# Patient Record
Sex: FEMALE | Race: WHITE | Employment: UNEMPLOYED | URBAN - METROPOLITAN AREA
[De-identification: names, ages, dates, MRNs, and addresses within clinical notes are randomized per-mention and may not be internally consistent; named-entity substitution may affect disease eponyms.]

---

## 2022-03-18 LAB
C. TRACHOMATIS, EXTERNAL RESULT: NEGATIVE
N. GONORRHOEAE, EXTERNAL RESULT: NEGATIVE

## 2022-06-16 LAB
ABO, EXTERNAL RESULT: NORMAL
HEP B, EXTERNAL RESULT: NEGATIVE
HEPATITIS C ANTIBODY, EXTERNAL RESULT: NEGATIVE
HIV, EXTERNAL RESULT: NEGATIVE
RH FACTOR, EXTERNAL RESULT: POSITIVE
RPR, EXTERNAL RESULT: NEGATIVE
RUBELLA TITER, EXTERNAL RESULT: NORMAL

## 2022-09-01 NOTE — PROGRESS NOTES
Labs unable to abstract: 8/3/22 glucola (122), hgb (10.5), hct(31.4) plts(223). 6/24/22 Ab Screen (negative), hgb (12.2), hct (35.4), plts(223).

## 2022-09-06 ENCOUNTER — NURSE ONLY (OUTPATIENT)
Dept: OBGYN CLINIC | Age: 32
End: 2022-09-06

## 2022-09-06 ENCOUNTER — ROUTINE PRENATAL (OUTPATIENT)
Dept: OBGYN CLINIC | Age: 32
End: 2022-09-06

## 2022-09-06 VITALS
HEIGHT: 66 IN | DIASTOLIC BLOOD PRESSURE: 76 MMHG | WEIGHT: 176 LBS | BODY MASS INDEX: 28.28 KG/M2 | SYSTOLIC BLOOD PRESSURE: 118 MMHG

## 2022-09-06 VITALS — HEIGHT: 66 IN | BODY MASS INDEX: 28.28 KG/M2 | WEIGHT: 176 LBS

## 2022-09-06 DIAGNOSIS — O09.899 RUBELLA NON-IMMUNE STATUS, ANTEPARTUM: ICD-10-CM

## 2022-09-06 DIAGNOSIS — Z3A.35 35 WEEKS GESTATION OF PREGNANCY: Primary | ICD-10-CM

## 2022-09-06 DIAGNOSIS — Z3A.35 35 WEEKS GESTATION OF PREGNANCY: ICD-10-CM

## 2022-09-06 DIAGNOSIS — Z34.90 ENCOUNTER FOR SUPERVISION OF LOW-RISK PREGNANCY, ANTEPARTUM: ICD-10-CM

## 2022-09-06 DIAGNOSIS — Z34.90 NORMAL REPEAT PREGNANCY, ANTEPARTUM: Primary | ICD-10-CM

## 2022-09-06 DIAGNOSIS — O44.40 LOW-LYING PLACENTA: ICD-10-CM

## 2022-09-06 DIAGNOSIS — O99.019 ANEMIA AFFECTING PREGNANCY, ANTEPARTUM: ICD-10-CM

## 2022-09-06 DIAGNOSIS — Z28.39 RUBELLA NON-IMMUNE STATUS, ANTEPARTUM: ICD-10-CM

## 2022-09-06 DIAGNOSIS — Z28.21 COVID-19 VACCINATION DECLINED: ICD-10-CM

## 2022-09-06 PROCEDURE — 99902 PR PRENATAL VISIT: CPT | Performed by: OBSTETRICS & GYNECOLOGY

## 2022-09-06 NOTE — PROGRESS NOTES
LATE OB TRANSER from [facility] 58 35w3d by d=8wk US   Denies LOF, VB, Ctxs. Good FM.       GBS at NV    *All records reviewed in depth by me today    +hx COVID 19 --DECLINED vaccination    Rubella NI -- MMR pp     Last Pap 18-- wnl (pt requests Pap postpartum)   STD cxs neg (3/22)    Glucola wnl at 122      OB hx  G1  2014 at [address] (7#6)-- Eula Page"   G2 MAB w/ D&C   G3  17 at 41wks (8#4.6)--\"Sexton\"  G4  5/15/19 at 39wks(7#9.1) --\"Paul\"  G5  2020 at 39wks (7#14.6)-- \"Alla Colon\"  G6 current         Anemia:    Hgb 10.5 (8/3/22)   Reports taking iron   Repeat NV ___          Prior Low lying placenta:  Posterior; now RESOLVED on last US in [place] on 22:  GP 50% (3#4), AC 25%

## 2022-09-06 NOTE — PROGRESS NOTES
NOB consult with pt. Pt transferred from [place], [place]. Labs today: none. Offered pt the option of CF, SMA, and Fragile X carrier testing. Pt declines testing. Offered pt the option of genetic screening (1st screen vs Tetra vs NIPT). Pt declines . Instructed pt on exercise/nutrition in pregnancy. Reviewed [facility] preg book. Advised pt on using SFE for hospital needs and S[facility] L&D for pregnancy related emergencies. Pt states understanding. NOB forms signed and scanned. Medical Hx: none    Surgical Hx: D&C 2016    Last Pap: 2018- pt request pap to be done postpartum    Pt OB c/o: yeast infection- using Monistat     COVID vaccine declined. Reviewed need for Tdap. Records reviewed and abstracted into chart.

## 2022-09-14 ENCOUNTER — ROUTINE PRENATAL (OUTPATIENT)
Dept: OBGYN CLINIC | Age: 32
End: 2022-09-14

## 2022-09-14 VITALS — BODY MASS INDEX: 28.73 KG/M2 | SYSTOLIC BLOOD PRESSURE: 121 MMHG | DIASTOLIC BLOOD PRESSURE: 76 MMHG | WEIGHT: 178 LBS

## 2022-09-14 DIAGNOSIS — O99.013 ANEMIA AFFECTING PREGNANCY IN THIRD TRIMESTER: ICD-10-CM

## 2022-09-14 DIAGNOSIS — O44.40 LOW-LYING PLACENTA: ICD-10-CM

## 2022-09-14 DIAGNOSIS — Z36.85 SCREENING, ANTENATAL, FOR STREPTOCOCCUS B: ICD-10-CM

## 2022-09-14 DIAGNOSIS — Z34.90 ENCOUNTER FOR SUPERVISION OF LOW-RISK PREGNANCY, ANTEPARTUM: Primary | ICD-10-CM

## 2022-09-14 PROCEDURE — 99902 PR PRENATAL VISIT: CPT | Performed by: OBSTETRICS & GYNECOLOGY

## 2022-09-14 NOTE — PROGRESS NOTES
LYSSA [id number] 36w4d  Denies LOF, VB, Ctxs. Good FM.       LATE OB TRANSER from [place], [place] at 35wks      GBS today  SVE: pt deferred    ceph by cristel     Pt desires elective IOL at 39wks for childcare purposes -- will plan to schedule at [facility] 19 vaccination       Last Pap 18-- wnl (pt requests Pap postpartum)   STD cxs neg (3/22)        OB hx  G1  2014 at [address] [facility] (7#6)-- Edwena Reaper"   G2 MAB w/ D&C   G3  17 at 41wks (8#4.6)--\"Sexton\"  G4  5/15/19 at 39wks(7#9.1) --\"Paul\"  G5  2020 at 39wks (7#14.6)-- \"Alla Colon\"  G6 current         Anemia:    Hgb 10.5 (8/3/22)   Reports taking iron   Repeat [place] (unable today due to 4 kids in office)          Prior Low lying placenta:  Posterior; now RESOLVED on last US in [place] on 22:  GP 50% (3#4), AC 25%

## 2022-09-18 LAB
BACTERIA SPEC CULT: NORMAL
SERVICE CMNT-IMP: NORMAL

## 2022-09-21 ENCOUNTER — ROUTINE PRENATAL (OUTPATIENT)
Dept: OBGYN CLINIC | Age: 32
End: 2022-09-21

## 2022-09-21 VITALS
HEIGHT: 66 IN | WEIGHT: 183 LBS | DIASTOLIC BLOOD PRESSURE: 60 MMHG | BODY MASS INDEX: 29.41 KG/M2 | SYSTOLIC BLOOD PRESSURE: 116 MMHG

## 2022-09-21 DIAGNOSIS — O44.40 LOW-LYING PLACENTA: ICD-10-CM

## 2022-09-21 DIAGNOSIS — Z34.90 ENCOUNTER FOR SUPERVISION OF LOW-RISK PREGNANCY, ANTEPARTUM: Primary | ICD-10-CM

## 2022-09-21 DIAGNOSIS — O99.013 ANEMIA AFFECTING PREGNANCY IN THIRD TRIMESTER: ICD-10-CM

## 2022-09-21 DIAGNOSIS — Z3A.37 37 WEEKS GESTATION OF PREGNANCY: ICD-10-CM

## 2022-09-21 LAB
ERYTHROCYTE [DISTWIDTH] IN BLOOD BY AUTOMATED COUNT: 13.2 % (ref 11.9–14.6)
HCT VFR BLD AUTO: 34.4 % (ref 35.8–46.3)
HGB BLD-MCNC: 10.9 G/DL (ref 11.7–15.4)
MCH RBC QN AUTO: 28.9 PG (ref 26.1–32.9)
MCHC RBC AUTO-ENTMCNC: 31.7 G/DL (ref 31.4–35)
MCV RBC AUTO: 91.2 FL (ref 79.6–97.8)
NRBC # BLD: 0 K/UL (ref 0–0.2)
PLATELET # BLD AUTO: 255 K/UL (ref 150–450)
PMV BLD AUTO: 10.4 FL (ref 9.4–12.3)
RBC # BLD AUTO: 3.77 M/UL (ref 4.05–5.2)
WBC # BLD AUTO: 7.8 K/UL (ref 4.3–11.1)

## 2022-09-21 PROCEDURE — 99902 PR PRENATAL VISIT: CPT | Performed by: OBSTETRICS & GYNECOLOGY

## 2022-09-21 NOTE — PROGRESS NOTES
Mercedes Hagen \"Sherly\"  presents for NADIA. [id number]. 37w4d. PL and MFM notes reviewed as applicable. Taking Prenatal Vitamins: Yes  She is noticing:  no unusual complaints  As some impt info is always in the [facility] flowsheet, please also refer to that.

## 2022-09-22 ENCOUNTER — TELEPHONE (OUTPATIENT)
Dept: OBGYN CLINIC | Age: 32
End: 2022-09-22

## 2022-09-22 NOTE — TELEPHONE ENCOUNTER
[facility] L&D, IOL scheduled on 10/7/22 AM. IOL form faxed to L&D. Pt to received map of SFE/IOL instructions at [place].

## 2022-09-26 ENCOUNTER — ROUTINE PRENATAL (OUTPATIENT)
Dept: OBGYN CLINIC | Age: 32
End: 2022-09-26

## 2022-09-26 VITALS — DIASTOLIC BLOOD PRESSURE: 68 MMHG | BODY MASS INDEX: 29.7 KG/M2 | WEIGHT: 184 LBS | SYSTOLIC BLOOD PRESSURE: 123 MMHG

## 2022-09-26 DIAGNOSIS — Z28.39 RUBELLA NON-IMMUNE STATUS, ANTEPARTUM: ICD-10-CM

## 2022-09-26 DIAGNOSIS — O09.899 RUBELLA NON-IMMUNE STATUS, ANTEPARTUM: ICD-10-CM

## 2022-09-26 DIAGNOSIS — Z28.21 COVID-19 VACCINATION DECLINED: ICD-10-CM

## 2022-09-26 DIAGNOSIS — O99.013 ANEMIA AFFECTING PREGNANCY IN THIRD TRIMESTER: ICD-10-CM

## 2022-09-26 DIAGNOSIS — Z34.90 ENCOUNTER FOR SUPERVISION OF LOW-RISK PREGNANCY, ANTEPARTUM: Primary | ICD-10-CM

## 2022-09-26 PROCEDURE — 59425 ANTEPARTUM CARE ONLY: CPT | Performed by: OBSTETRICS & GYNECOLOGY

## 2022-09-26 NOTE — PROGRESS NOTES
NADIA. [id number] 38w2d  Denies LOF, VB, Ctxs. Good FM.        LATE OB TRANSER from [place], [place] at 35wks      GBS neg  SVE: /-2, anterior cervix   Ceph by cristel          Pt desires elective IOL at 39wks for childcare purposes   IOL scheduled on Friday, 10/7/22 AM (im on call) -- will plan to start Pit and AROM  This was discussed in depth w/ pt today       Declined COVID 19 vaccination       Last Pap 18-- wnl (pt requests Pap postpartum)   STD cxs neg (3/22)        OB hx  G1  2014 at [address] [facility] (7#6)-- Al Vila"   G2 MAB w/ D&C   G3  17 at 41wks (8#4.6)--\"Sexton\"  G4  5/15/19 at 39wks(7#9.1) --\"Paul\"  G5  2020 at 39wks (7#14.6)-- \"Alla Colon\"  G6 current         Anemia:    Hgb 10.5 (8/3/22)   Reports taking iron   Hgb 10.9 ()         Prior Low lying placenta:  Posterior; now RESOLVED on last US in [place] on 22:  GP 50% (3#4), AC 25%

## 2022-10-06 ENCOUNTER — TELEPHONE (OUTPATIENT)
Dept: OBGYN CLINIC | Age: 32
End: 2022-10-06

## 2022-10-06 NOTE — TELEPHONE ENCOUNTER
Patient LM that she can not coming to appointment this morning due to having a fever.   She wants to know if she needs to do anything different about her scheduled induction scheduled for tomorrow

## 2022-10-06 NOTE — TELEPHONE ENCOUNTER
Called patient. Informed her to call L&D regarding her symptoms. Number given. She wants to speak to Dr Carol Ann Salinas directly.

## 2022-10-10 ENCOUNTER — TELEPHONE (OUTPATIENT)
Dept: OBGYN CLINIC | Age: 32
End: 2022-10-10

## 2022-10-10 NOTE — TELEPHONE ENCOUNTER
Pt called requesting to move her IOL on 10/11/22 to 10/13/22 due to being sick. Pt states her family had the flu last week. Pt states her fever is gone and her cough is improving but her mother is now sick and she needs her for childcare. Informed pt I would have to discuss with MD on call for 10/13/22 (). Pt reports good fetal movement. Encouraged pt to perform kick counts daily as she is now post dates. Pt agree's and voiced understanding. S[facility] L&D notified of above information. Will review with  on 10/11/22.

## 2022-10-13 ENCOUNTER — ANESTHESIA (OUTPATIENT)
Dept: LABOR AND DELIVERY | Age: 32
End: 2022-10-13

## 2022-10-13 ENCOUNTER — ANESTHESIA EVENT (OUTPATIENT)
Dept: LABOR AND DELIVERY | Age: 32
End: 2022-10-13

## 2022-10-13 ENCOUNTER — HOSPITAL ENCOUNTER (INPATIENT)
Age: 32
LOS: 1 days | Discharge: HOME | End: 2022-10-14
Attending: OBSTETRICS & GYNECOLOGY | Admitting: OBSTETRICS & GYNECOLOGY

## 2022-10-13 LAB
ABO + RH BLD: NORMAL
BASOPHILS # BLD: 0 K/UL (ref 0–0.2)
BASOPHILS NFR BLD: 0 % (ref 0–2)
BLOOD GROUP ANTIBODIES SERPL: NORMAL
DIFFERENTIAL METHOD BLD: ABNORMAL
EOSINOPHIL # BLD: 0.1 K/UL (ref 0–0.8)
EOSINOPHIL NFR BLD: 1 % (ref 0.5–7.8)
ERYTHROCYTE [DISTWIDTH] IN BLOOD BY AUTOMATED COUNT: 14.4 % (ref 11.9–14.6)
HCT VFR BLD AUTO: 35.4 % (ref 35.8–46.3)
HGB BLD-MCNC: 11.4 G/DL (ref 11.7–15.4)
IMM GRANULOCYTES # BLD AUTO: 0 K/UL (ref 0–0.5)
IMM GRANULOCYTES NFR BLD AUTO: 0 % (ref 0–5)
LYMPHOCYTES # BLD: 3.1 K/UL (ref 0.5–4.6)
LYMPHOCYTES NFR BLD: 48 % (ref 13–44)
MCH RBC QN AUTO: 28.4 PG (ref 26.1–32.9)
MCHC RBC AUTO-ENTMCNC: 32.2 G/DL (ref 31.4–35)
MCV RBC AUTO: 88.1 FL (ref 82–102)
MONOCYTES # BLD: 0.4 K/UL (ref 0.1–1.3)
MONOCYTES NFR BLD: 7 % (ref 4–12)
NEUTS SEG # BLD: 2.8 K/UL (ref 1.7–8.2)
NEUTS SEG NFR BLD: 44 % (ref 43–78)
NRBC # BLD: 0 K/UL (ref 0–0.2)
PLATELET # BLD AUTO: 214 K/UL (ref 150–450)
PMV BLD AUTO: 9.5 FL (ref 9.4–12.3)
RBC # BLD AUTO: 4.02 M/UL (ref 4.05–5.2)
SPECIMEN EXP DATE BLD: NORMAL
WBC # BLD AUTO: 6.4 K/UL (ref 4.3–11.1)

## 2022-10-13 PROCEDURE — 7210000100 HC LABOR FEE PER 1 HR

## 2022-10-13 PROCEDURE — 86901 BLOOD TYPING SEROLOGIC RH(D): CPT

## 2022-10-13 PROCEDURE — 0HQ9XZZ REPAIR PERINEUM SKIN, EXTERNAL APPROACH: ICD-10-PCS | Performed by: OBSTETRICS & GYNECOLOGY

## 2022-10-13 PROCEDURE — 7100000010 HC PHASE II RECOVERY - FIRST 15 MIN

## 2022-10-13 PROCEDURE — 6360000002 HC RX W HCPCS: Performed by: OBSTETRICS & GYNECOLOGY

## 2022-10-13 PROCEDURE — 6360000002 HC RX W HCPCS: Performed by: REGISTERED NURSE

## 2022-10-13 PROCEDURE — 59400 OBSTETRICAL CARE: CPT | Performed by: OBSTETRICS & GYNECOLOGY

## 2022-10-13 PROCEDURE — 7100000011 HC PHASE II RECOVERY - ADDTL 15 MIN

## 2022-10-13 PROCEDURE — 4A1HXCZ MONITORING OF PRODUCTS OF CONCEPTION, CARDIAC RATE, EXTERNAL APPROACH: ICD-10-PCS | Performed by: OBSTETRICS & GYNECOLOGY

## 2022-10-13 PROCEDURE — 6370000000 HC RX 637 (ALT 250 FOR IP): Performed by: OBSTETRICS & GYNECOLOGY

## 2022-10-13 PROCEDURE — 85025 COMPLETE CBC W/AUTO DIFF WBC: CPT

## 2022-10-13 PROCEDURE — 2580000003 HC RX 258: Performed by: OBSTETRICS & GYNECOLOGY

## 2022-10-13 PROCEDURE — 62325 NJX INTERLAMINAR CRV/THRC: CPT | Performed by: ANESTHESIOLOGY

## 2022-10-13 PROCEDURE — 7220000101 HC DELIVERY VAGINAL/SINGLE

## 2022-10-13 PROCEDURE — 36415 COLL VENOUS BLD VENIPUNCTURE: CPT

## 2022-10-13 PROCEDURE — 1100000000 HC RM PRIVATE

## 2022-10-13 RX ORDER — LIDOCAINE HYDROCHLORIDE 10 MG/ML
30 INJECTION, SOLUTION EPIDURAL; INFILTRATION; INTRACAUDAL; PERINEURAL PRN
Status: DISCONTINUED | OUTPATIENT
Start: 2022-10-13 | End: 2022-10-13

## 2022-10-13 RX ORDER — TRANEXAMIC ACID 10 MG/ML
1000 INJECTION, SOLUTION INTRAVENOUS
Status: ACTIVE | OUTPATIENT
Start: 2022-10-13 | End: 2022-10-14

## 2022-10-13 RX ORDER — SODIUM CHLORIDE 0.9 % (FLUSH) 0.9 %
5-40 SYRINGE (ML) INJECTION PRN
Status: DISCONTINUED | OUTPATIENT
Start: 2022-10-13 | End: 2022-10-14 | Stop reason: ALTCHOICE

## 2022-10-13 RX ORDER — IBUPROFEN 800 MG/1
800 TABLET ORAL EVERY 6 HOURS
Status: DISCONTINUED | OUTPATIENT
Start: 2022-10-13 | End: 2022-10-14 | Stop reason: HOSPADM

## 2022-10-13 RX ORDER — SODIUM CHLORIDE 0.9 % (FLUSH) 0.9 %
5-40 SYRINGE (ML) INJECTION EVERY 12 HOURS SCHEDULED
Status: DISCONTINUED | OUTPATIENT
Start: 2022-10-13 | End: 2022-10-14 | Stop reason: ALTCHOICE

## 2022-10-13 RX ORDER — TERBUTALINE SULFATE 1 MG/ML
0.25 INJECTION, SOLUTION SUBCUTANEOUS
Status: DISCONTINUED | OUTPATIENT
Start: 2022-10-13 | End: 2022-10-14 | Stop reason: ALTCHOICE

## 2022-10-13 RX ORDER — ROPIVACAINE HYDROCHLORIDE 2 MG/ML
INJECTION, SOLUTION EPIDURAL; INFILTRATION; PERINEURAL CONTINUOUS PRN
Status: DISCONTINUED | OUTPATIENT
Start: 2022-10-13 | End: 2022-10-13 | Stop reason: SDUPTHER

## 2022-10-13 RX ORDER — SODIUM CHLORIDE 0.9 % (FLUSH) 0.9 %
5-40 SYRINGE (ML) INJECTION EVERY 12 HOURS SCHEDULED
Status: DISCONTINUED | OUTPATIENT
Start: 2022-10-13 | End: 2022-10-13 | Stop reason: SDUPTHER

## 2022-10-13 RX ORDER — ONDANSETRON 8 MG/1
8 TABLET, ORALLY DISINTEGRATING ORAL EVERY 8 HOURS PRN
Status: DISCONTINUED | OUTPATIENT
Start: 2022-10-13 | End: 2022-10-14 | Stop reason: HOSPADM

## 2022-10-13 RX ORDER — HYDROCORTISONE ACETATE PRAMOXINE HCL 2.5; 1 G/100G; G/100G
CREAM TOPICAL
Status: DISCONTINUED | OUTPATIENT
Start: 2022-10-13 | End: 2022-10-14 | Stop reason: HOSPADM

## 2022-10-13 RX ORDER — DOCUSATE SODIUM 100 MG/1
100 CAPSULE, LIQUID FILLED ORAL 2 TIMES DAILY
Status: DISCONTINUED | OUTPATIENT
Start: 2022-10-13 | End: 2022-10-14 | Stop reason: HOSPADM

## 2022-10-13 RX ORDER — CARBOPROST TROMETHAMINE 250 UG/ML
250 INJECTION, SOLUTION INTRAMUSCULAR PRN
Status: DISCONTINUED | OUTPATIENT
Start: 2022-10-13 | End: 2022-10-14 | Stop reason: HOSPADM

## 2022-10-13 RX ORDER — LANOLIN
CREAM (ML) TOPICAL PRN
Status: DISCONTINUED | OUTPATIENT
Start: 2022-10-13 | End: 2022-10-14 | Stop reason: HOSPADM

## 2022-10-13 RX ORDER — DOCUSATE SODIUM 100 MG/1
100 CAPSULE, LIQUID FILLED ORAL 2 TIMES DAILY
Status: DISCONTINUED | OUTPATIENT
Start: 2022-10-13 | End: 2022-10-14 | Stop reason: ALTCHOICE

## 2022-10-13 RX ORDER — SODIUM CHLORIDE, SODIUM LACTATE, POTASSIUM CHLORIDE, CALCIUM CHLORIDE 600; 310; 30; 20 MG/100ML; MG/100ML; MG/100ML; MG/100ML
INJECTION, SOLUTION INTRAVENOUS CONTINUOUS
Status: DISCONTINUED | OUTPATIENT
Start: 2022-10-13 | End: 2022-10-14 | Stop reason: ALTCHOICE

## 2022-10-13 RX ORDER — ACETAMINOPHEN 500 MG
1000 TABLET ORAL EVERY 6 HOURS PRN
Status: DISCONTINUED | OUTPATIENT
Start: 2022-10-13 | End: 2022-10-14 | Stop reason: HOSPADM

## 2022-10-13 RX ORDER — ROPIVACAINE HYDROCHLORIDE 5 MG/ML
INJECTION, SOLUTION EPIDURAL; INFILTRATION; PERINEURAL PRN
Status: DISCONTINUED | OUTPATIENT
Start: 2022-10-13 | End: 2022-10-13 | Stop reason: SDUPTHER

## 2022-10-13 RX ORDER — POLYETHYLENE GLYCOL 3350 17 G/17G
17 POWDER, FOR SOLUTION ORAL DAILY
Status: DISCONTINUED | OUTPATIENT
Start: 2022-10-13 | End: 2022-10-14 | Stop reason: HOSPADM

## 2022-10-13 RX ORDER — METHYLERGONOVINE MALEATE 0.2 MG/ML
200 INJECTION INTRAVENOUS PRN
Status: DISCONTINUED | OUTPATIENT
Start: 2022-10-13 | End: 2022-10-14 | Stop reason: HOSPADM

## 2022-10-13 RX ORDER — SODIUM CHLORIDE, SODIUM LACTATE, POTASSIUM CHLORIDE, AND CALCIUM CHLORIDE .6; .31; .03; .02 G/100ML; G/100ML; G/100ML; G/100ML
1000 INJECTION, SOLUTION INTRAVENOUS PRN
Status: DISCONTINUED | OUTPATIENT
Start: 2022-10-13 | End: 2022-10-14 | Stop reason: ALTCHOICE

## 2022-10-13 RX ORDER — SODIUM CHLORIDE 0.9 % (FLUSH) 0.9 %
5-40 SYRINGE (ML) INJECTION PRN
Status: DISCONTINUED | OUTPATIENT
Start: 2022-10-13 | End: 2022-10-13 | Stop reason: SDUPTHER

## 2022-10-13 RX ORDER — OXYCODONE HYDROCHLORIDE 5 MG/1
5 TABLET ORAL EVERY 4 HOURS PRN
Status: DISCONTINUED | OUTPATIENT
Start: 2022-10-13 | End: 2022-10-14 | Stop reason: HOSPADM

## 2022-10-13 RX ORDER — ONDANSETRON 2 MG/ML
4 INJECTION INTRAMUSCULAR; INTRAVENOUS EVERY 6 HOURS PRN
Status: DISCONTINUED | OUTPATIENT
Start: 2022-10-13 | End: 2022-10-14 | Stop reason: ALTCHOICE

## 2022-10-13 RX ORDER — FENTANYL CITRATE 50 UG/ML
INJECTION, SOLUTION INTRAMUSCULAR; INTRAVENOUS PRN
Status: DISCONTINUED | OUTPATIENT
Start: 2022-10-13 | End: 2022-10-13 | Stop reason: SDUPTHER

## 2022-10-13 RX ORDER — FENTANYL CITRATE 50 UG/ML
INJECTION, SOLUTION INTRAMUSCULAR; INTRAVENOUS PRN
Status: DISCONTINUED | OUTPATIENT
Start: 2022-10-13 | End: 2022-10-13

## 2022-10-13 RX ORDER — SODIUM CHLORIDE, SODIUM LACTATE, POTASSIUM CHLORIDE, AND CALCIUM CHLORIDE .6; .31; .03; .02 G/100ML; G/100ML; G/100ML; G/100ML
500 INJECTION, SOLUTION INTRAVENOUS PRN
Status: DISCONTINUED | OUTPATIENT
Start: 2022-10-13 | End: 2022-10-14 | Stop reason: ALTCHOICE

## 2022-10-13 RX ORDER — SODIUM CHLORIDE 9 MG/ML
INJECTION, SOLUTION INTRAVENOUS PRN
Status: DISCONTINUED | OUTPATIENT
Start: 2022-10-13 | End: 2022-10-14 | Stop reason: ALTCHOICE

## 2022-10-13 RX ORDER — MISOPROSTOL 200 UG/1
800 TABLET ORAL PRN
Status: DISCONTINUED | OUTPATIENT
Start: 2022-10-13 | End: 2022-10-14 | Stop reason: HOSPADM

## 2022-10-13 RX ORDER — SIMETHICONE 80 MG
80 TABLET,CHEWABLE ORAL EVERY 6 HOURS PRN
Status: DISCONTINUED | OUTPATIENT
Start: 2022-10-13 | End: 2022-10-14 | Stop reason: HOSPADM

## 2022-10-13 RX ORDER — SODIUM CHLORIDE 9 MG/ML
25 INJECTION, SOLUTION INTRAVENOUS PRN
Status: DISCONTINUED | OUTPATIENT
Start: 2022-10-13 | End: 2022-10-13 | Stop reason: SDUPTHER

## 2022-10-13 RX ADMIN — POLYETHYLENE GLYCOL 3350 17 G: 17 POWDER, FOR SOLUTION ORAL at 18:43

## 2022-10-13 RX ADMIN — Medication 1 MILLI-UNITS/MIN: at 08:15

## 2022-10-13 RX ADMIN — FENTANYL CITRATE 100 MCG: 50 INJECTION, SOLUTION INTRAMUSCULAR; INTRAVENOUS at 12:13

## 2022-10-13 RX ADMIN — Medication: at 15:39

## 2022-10-13 RX ADMIN — Medication 87.3 MILLI-UNITS/MIN: at 13:25

## 2022-10-13 RX ADMIN — SODIUM CHLORIDE, POTASSIUM CHLORIDE, SODIUM LACTATE AND CALCIUM CHLORIDE 1000 ML: 600; 310; 30; 20 INJECTION, SOLUTION INTRAVENOUS at 10:56

## 2022-10-13 RX ADMIN — IBUPROFEN 800 MG: 800 TABLET, FILM COATED ORAL at 15:39

## 2022-10-13 RX ADMIN — Medication 999 MILLI-UNITS/MIN: at 12:44

## 2022-10-13 RX ADMIN — IBUPROFEN 800 MG: 800 TABLET, FILM COATED ORAL at 21:27

## 2022-10-13 RX ADMIN — ROPIVACAINE HYDROCHLORIDE 9 ML/HR: 2 INJECTION, SOLUTION EPIDURAL; INFILTRATION; PERINEURAL at 11:44

## 2022-10-13 RX ADMIN — ROPIVACAINE HYDROCHLORIDE 8 ML: 2 INJECTION, SOLUTION EPIDURAL; INFILTRATION; PERINEURAL at 11:43

## 2022-10-13 RX ADMIN — DOCUSATE SODIUM 100 MG: 100 CAPSULE ORAL at 21:27

## 2022-10-13 RX ADMIN — SODIUM CHLORIDE, POTASSIUM CHLORIDE, SODIUM LACTATE AND CALCIUM CHLORIDE: 600; 310; 30; 20 INJECTION, SOLUTION INTRAVENOUS at 07:46

## 2022-10-13 RX ADMIN — WITCH HAZEL: 500 SOLUTION RECTAL; TOPICAL at 15:39

## 2022-10-13 RX ADMIN — ACETAMINOPHEN 1000 MG: 500 TABLET, FILM COATED ORAL at 23:13

## 2022-10-13 RX ADMIN — ROPIVACAINE HYDROCHLORIDE 5 ML: 5 INJECTION, SOLUTION EPIDURAL; INFILTRATION; PERINEURAL at 12:13

## 2022-10-13 ASSESSMENT — PAIN DESCRIPTION - LOCATION
LOCATION: ABDOMEN

## 2022-10-13 ASSESSMENT — PAIN DESCRIPTION - ONSET
ONSET: GRADUAL
ONSET: GRADUAL

## 2022-10-13 ASSESSMENT — PAIN DESCRIPTION - DESCRIPTORS
DESCRIPTORS: CRAMPING

## 2022-10-13 ASSESSMENT — PAIN DESCRIPTION - PAIN TYPE
TYPE: ACUTE PAIN
TYPE: ACUTE PAIN

## 2022-10-13 ASSESSMENT — PAIN DESCRIPTION - FREQUENCY
FREQUENCY: INTERMITTENT

## 2022-10-13 ASSESSMENT — PAIN - FUNCTIONAL ASSESSMENT
PAIN_FUNCTIONAL_ASSESSMENT: ACTIVITIES ARE NOT PREVENTED
PAIN_FUNCTIONAL_ASSESSMENT: ACTIVITIES ARE NOT PREVENTED

## 2022-10-13 ASSESSMENT — PAIN SCALES - GENERAL
PAINLEVEL_OUTOF10: 1
PAINLEVEL_OUTOF10: 5
PAINLEVEL_OUTOF10: 4

## 2022-10-13 NOTE — PROGRESS NOTES
[facility]      Dr Kirill Lawrence at bedside at [id number]. BUDDY Kimble at bedside at 1126    Assisted pt to sitting up on bedside at 1127. Timeout completed at 200 with MD, BUDDY and myself at bedside. Test dose given at 1136. Negative reaction. Dose given at 1140. Pt assisted to lying back in left tilt position @ 1140. See anesthesia record for details. See vital sign flow sheet for BP. Tolerated procedure well.       Dr Kirill Lawrence left room [phone]  CRNA left room 95 [phone]

## 2022-10-13 NOTE — H&P
History & Physical    Name: Easter Gilford MRN: [id number]  SSN: [id number]    YOB: 1990  Age: 27 y.o. Sex: female      Subjective:     Estimated Date of Delivery: 10/8/22  OB History    Para Term  AB Living   6 4 4   1 4   SAB IAB Ectopic Molar Multiple Live Births   1         4      # Outcome Date GA Lbr Joseph/2nd Weight Sex Delivery Anes PTL Lv   6 Current            5 Term 20 39w0d  7 lb 14 oz (3.572 kg) F  EPI     4 Term 05/15/19 39w0d  7 lb 9 oz (3.43 kg) M  EPI     3 Term 17 40w0d  8 lb 4 oz (3.742 kg) M Vag-Spont EPI     2 SAB 2016              Birth Comments: D&C   1 Term 14 39w0d  7 lb 6 oz (3.345 kg) F Vag-Spont EPI        Obstetric Comments   OB hx   G1  2014 at [facility] (7#6)-- Frida Boy"    G2 MAB w/ D&C 2016   G3  17 at 41wks (8#4.6)--\"Sexton\"   G4  5/15/19 at 39wks(7#9.1) --\"Paul\"   G5  2020 at 39wks (7#14.6)-- \"Alla Darlene\"   G6 current       Ms. Brianna Suarez is admitted with pregnancy at 40w5d for induction of labor due to term gestation. Prenatal course was complicated by  RNI, hx of 3 prior SVDs . Please see prenatal records for details.     Past Medical History:   Diagnosis Date    COVID-19 vaccination declined     Rubella non-immune status, antepartum      Past Surgical History:   Procedure Laterality Date    DILATION AND CURETTAGE OF UTERUS  2016     Social History     Occupational History    Not on file   Tobacco Use    Smoking status: Never    Smokeless tobacco: Never   Vaping Use    Vaping Use: Never used   Substance and Sexual Activity    Alcohol use: Not on file    Drug use: Not Currently    Sexual activity: Yes     Family History   Problem Relation Age of Onset    Lung Cancer Paternal Grandfather     Lung Cancer Paternal Grandmother     Diabetes Maternal Grandmother     Heart Surgery Maternal Grandfather     Hypertension Maternal Grandfather     Hypertension Father     Colon Cancer Neg Hx     Breast Cancer Neg Hx        No Known Allergies  Prior to Admission medications    Medication Sig Start Date End Date Taking? Authorizing Provider   Prenatal MV & Min w/FA-DHA (ONE A DAY PRENATAL PO) Take by mouth    Historical Provider, MD   Ferrous Sulfate (SLOW FE PO) Take by mouth    Historical Provider, MD        Review of Systems:   Pertinent ROS noted in the HPI. Objective:     Vitals:  Vitals:    10/13/22 1300 10/13/22 1315 10/13/22 1330 10/13/22 1345   BP: (!) 115/53 (!) 109/53 (!) 110/56 (!) 105/55   Pulse: 88 61 67 60   Resp:       Temp:       TempSrc:       SpO2:       Weight:       Height:            Physical Exam:  Constitutional: no distress  Heart: RRR  Lungs: CTAB  Abdomen: soft, nontender  LE: no swelling  SVE: 2/50/-3 this morning  Membranes:  Intact  Fetal Heart Rate: Reactive          Prenatal Labs:   Lab Results   Component Value Date/Time    ABORH O POSITIVE 10/13/2022 07:30 AM       Impression/Plan:     Principal Problem:    Normal labor and delivery  Resolved Problems:    * No resolved hospital problems. *       Plan: Admit for induction of labor. Group B Strep negative. IOL Counseling:     Factual information regarding the medical condition and the need for induction of labor was discussed with the patient, who verbalized understanding. The therapeutic rationale, benefits, risks and potential complications were discussed, including the possibility of pain, bleeding, infection, loss of function, disfigurement, death or other unforeseen complications. Alternatives to the procedure were discussed (including potential risks, complications and benefits of each). No guarantee was expressed or implied as to the results of the procedure.  Informed consent was given, as well as a request to proceed

## 2022-10-13 NOTE — ANESTHESIA PRE PROCEDURE
Department of Anesthesiology  Preprocedure Note       Name:  Carissa Bullard   Age:  27 y.o.  :  1990                                          MRN:  [id number]         Date:  10/13/2022      Surgeon: * No surgeons listed *    Procedure: * No procedures listed *    Medications prior to admission:   Prior to Admission medications    Medication Sig Start Date End Date Taking?  Authorizing Provider   Prenatal MV & Min w/FA-DHA (ONE A DAY PRENATAL PO) Take by mouth    Historical Provider, MD   Ferrous Sulfate (SLOW FE PO) Take by mouth    Historical Provider, MD       Current medications:    Current Facility-Administered Medications   Medication Dose Route Frequency Provider Last Rate Last Admin    lactated ringers infusion   IntraVENous Continuous Braxton Camacho  mL/hr at 10/13/22 0746 New Bag at 10/13/22 0746    lactated ringers bolus  500 mL IntraVENous PRN Braxton Camacho MD        Or    lactated ringers bolus  1,000 mL IntraVENous PRN Braxton Camacho MD   Stopped at 10/13/22 1123    sodium chloride flush 0.9 % injection 5-40 mL  5-40 mL IntraVENous 2 times per day Braxton Camacho MD        sodium chloride flush 0.9 % injection 5-40 mL  5-40 mL IntraVENous PRN Braxton Camacho MD        0.9 % sodium chloride infusion  25 mL IntraVENous PRN Braxton Camacho MD        [Held by provider] methylergonovine (METHERGINE) injection 200 mcg  200 mcg IntraMUSCular PRN Braxton Camacho MD        [Held by provider] carboprost (HEMABATE) injection 250 mcg  250 mcg IntraMUSCular PRN Braxton Camacho MD        [Held by provider] miSOPROStol (CYTOTEC) tablet 800 mcg  800 mcg Rectal PRN Braxton Camacho MD        [Held by provider] tranexamic acid-NaCl IVPB premix 1,000 mg  1,000 mg IntraVENous Once PRN Braxton Camacho MD        [Held by provider] oxytocin (PITOCIN) 30 units in 500 mL infusion  87.3 wesley-units/min IntraVENous Continuous PRN Braxton Camacho MD        And  oxytocin (PITOCIN) 10 unit bolus from the bag  10 Units IntraVENous PRN Daniel Dallas MD        ondansetron (ZOFRAN) injection 4 mg  4 mg IntraVENous Q6H PRN Daniel Dallas MD        [Held by provider] docusate sodium (COLACE) capsule 100 mg  100 mg Oral BID Daniel Dallas MD        butorphanol (STADOL) injection 1 mg  1 mg IntraVENous Q3H PRN Daniel Dallas MD        oxytocin (PITOCIN) 30 units in 500 mL infusion  1-20 wesley-units/min IntraVENous Continuous Daniel Dallas MD 6 mL/hr at 10/13/22 1045 6 wesley-units/min at 10/13/22 1045    terbutaline (BRETHINE) injection 0.25 mg  0.25 mg SubCUTAneous Once PRN Daniel Dallas MD        [Held by provider] lidocaine 1 % injection 30 mL  30 mL Other PRN Daniel Dallas MD           Allergies:  No Known Allergies    Problem List:    Patient Active Problem List   Diagnosis Code    COVID-19 vaccination declined Z28.21    Rubella non-immune status, antepartum O09.899, Z28.39    Low-lying placenta (RESOLVED) O44.40    Encounter for supervision of low-risk pregnancy, antepartum Z34.90    Anemia affecting pregnancy O99.019    Normal labor and delivery O80       Past Medical History:        Diagnosis Date    COVID-19 vaccination declined     Rubella non-immune status, antepartum        Past Surgical History:        Procedure Laterality Date    DILATION AND CURETTAGE OF UTERUS  2016       Social History:    Social History     Tobacco Use    Smoking status: Never    Smokeless tobacco: Never   Substance Use Topics    Alcohol use: Not on file                                Counseling given: Not Answered      Vital Signs (Current):   Vitals:    10/13/22 0638 10/13/22 0846 10/13/22 1100   BP: 112/66 115/70 120/70   Pulse: 94 67 72   Resp:   18   Temp:   98.3 °F (36.8 °C)   TempSrc:   Oral   SpO2:   98%                                              BP Readings from Last 3 Encounters:   10/13/22 120/70   09/26/22 123/68   09/21/22 116/60       NPO Status:                                                                                 BMI:   Wt Readings from Last 3 Encounters:   09/26/22 184 lb (83.5 kg)   09/21/22 183 lb (83 kg)   09/14/22 178 lb (80.7 kg)     There is no height or weight on file to calculate BMI.    CBC:   Lab Results   Component Value Date/Time    WBC 6.4 10/13/2022 07:04 AM    RBC 4.02 10/13/2022 07:04 AM    HGB 11.4 10/13/2022 07:04 AM    HCT 35.4 10/13/2022 07:04 AM    MCV 88.1 10/13/2022 07:04 AM    RDW 14.4 10/13/2022 07:04 AM     10/13/2022 07:04 AM       CMP: No results found for: NA, K, CL, CO2, BUN, CREATININE, GFRAA, AGRATIO, LABGLOM, GLUCOSE, GLU, PROT, CALCIUM, BILITOT, ALKPHOS, AST, ALT    POC Tests: No results for input(s): POCGLU, POCNA, POCK, POCCL, POCBUN, POCHEMO, POCHCT in the last 72 hours. Coags: No results found for: PROTIME, INR, APTT    HCG (If Applicable): No results found for: PREGTESTUR, PREGSERUM, HCG, HCGQUANT     ABGs: No results found for: PHART, PO2ART, KUW1NGH, KSP2ZTP, BEART, N5AOAJZT     Type & Screen (If Applicable):  No results found for: LABABO, LABRH    Drug/Infectious Status (If Applicable):  No results found for: HIV, HEPCAB    COVID-19 Screening (If Applicable): No results found for: COVID19        Anesthesia Evaluation  Patient summary reviewed and Nursing notes reviewed no history of anesthetic complications:   Airway: Mallampati: II  TM distance: >3 FB   Neck ROM: full  Mouth opening: > = 3 FB   Dental:          Pulmonary:normal exam                               Cardiovascular:                      Neuro/Psych:               GI/Hepatic/Renal:             Endo/Other:    (+) blood dyscrasia: anemia:., .                 Abdominal:             Vascular: Other Findings:           Anesthesia Plan      epidural     ASA 2             Anesthetic plan and risks discussed with patient. Use of blood products discussed with patient whom consented to blood products. Post-op pain plan if not by surgeon: continuous epidural            Noelle Elias MD   10/13/2022

## 2022-10-13 NOTE — L&D DELIVERY NOTE
Delivery Note    Obstetrician:  Ibrahima Gray MD    Pre-Delivery Diagnosis: Term pregnancy    Post-Delivery Diagnosis: Living  infant(s) and Male    Intrapartum Event: None    Procedure: Spontaneous vaginal delivery    Epidural: Yes    Monitor:  Fetal Heart Tones - External and Uterine Contractions - External    Indications for instrumental delivery: none    Estimated Blood Loss: 30    Episiotomy: none    Laceration(s):  1st degree    Laceration(s) repair: Yes    Presentation: Cephalic    Fetal Description: calvert    Fetal Position: Left Occiput Anterior    BABY INFORMATION  NAME:   Information for the patient's :  Daniel Marking [[id number]]   Silva Vanessa   SEX: female  DATE AND TIME OF BIRTH:   Information for the patient's :  [place] Mark[place] [[id number]]   10/13/2023  at   Information for the patient's :  Daniel Marking [[id number]]   3270   BIRTH MEASUREMENTS:   Information for the patient's :  Daniel Marking [[id number]]     and   Information for the patient's :  [place] Mark[place] [[id number]]    . APGARS:  Information for the patient's :  [place] Mark[place] [[id number]]       Information for the patient's :  [place] Mark[place] [[id number]]        Umbilical Cord: True knot, 3 vessels present, and Cord blood sent to lab for type, Rh, and Otf' test    Specimens: none, placenta was disposed           Complications:  none           Lab Results   Component Value Date/Time    ABORH O POSITIVE 10/13/2022 07:30 AM            Attending Attestation: I performed the procedure. No dystocia was encountered.     Radha Joyner MD FACOG                Mother's Information      Labor Events     Labor?: No  Cervical Ripening:   Now               [place] Marking [[id number]]      Labor Events     Labor?: No   Steroids?: None  Cervical Ripening Date/Time: Antibiotics Received during Labor?: No  Rupture Date/Time: 10/13/22 10:21:00   Rupture Type: SROM  Fluid Color: Clear  Fluid Odor: None  Fluid Volume: Moderate  Induction: Oxytocin  Augmentation: None  Labor Complications: None       Anesthesia    Method: Epidural       Start Pushing      Labor onset date/time: 10/13/22 08:15:00 Now     Dilation complete date/time: 10/13/22 12:37:00 Now     Start pushing date/time: 10/13/2022 12:37:00   Decision date/time (emergent ):           Delivery ()      Delivery Date/Time:  10/13/22 12:44:00   Delivery Type: Vaginal, Spontaneous    Details:            [place] Presentation    Presentation: Vertex  Position: Left  _: Occiput  _: Anterior       Shoulder Dystocia    Shoulder Dystocia Present?: No  Add Second Maneuver  Add Third Maneuver  Add Fourth Maneuver  Add Fifth Maneuver  Add Sixth Maneuver  Add Seventh Maneuver  Add Eighth Maneuver  Add Ninth Maneuver       Assisted Delivery Details    Forceps Attempted?: No  Vacuum Extractor Attempted?: No       Document Additional Attempt         Document Additional Attempt                 Cord    Vessels: 3 Vessels  Complications: Knot  Delayed Cord Clamping?: Yes  Cord Clamped Date/Time: 10/13/2022 12:45:00  Cord Blood Disposition: Lab  Gases Sent?: No       Placenta    Date/Time: 10/13/2022 12:44:00  Removal: Spontaneous  Appearance: Intact  Disposition: Discarded       Lacerations    Episiotomy: None  Perineal Lacerations: 1st  Other Lacerations: no non-perineal laceration  Number of Repair Packets: 1       Vaginal Counts    Initial Count Personnel: YAMILA  Initial Count Verified By: ALFRED MCKINNEY    Sponges [place] [employer]   Initial Counts Correct Correct Correct   Final Counts Correct Correct Correct   Final Count Personnel:  YAMILA  Final Count Verified By: ALFRED MCKINNEY  Accurate Final Count?: Yes  If the count is incorrect due to Intentionally Retained Foreign Object (IRFO) add the IRFO LDA in Lines/Drains. Add LDA: Link to [facility]       Blood Loss  Mother: Correne Mohs \"[id number]\" #[id number]     Start of Mother's Information      Delivery Blood Loss  10/13/22 0815 - 10/13/22 1400      Quantitative Blood Loss (mL) Hospital Encounter 30 grams    Total  30 mL               End of Mother's Information  Mother: Correne Mohs \"[id number]\" #[id number]                Delivery Providers    Delivering clinician: Sury Cleaning MD     Provider Role    Sury Cleaning MD Obstetrician    Maciel Valdez, RN Primary Nurse    Shaye Hammond, RN Primary [place] Nurse    Armani Leung, RN Primary  Nurse    Lindsey Allen, RN Charge Nurse    [address] Scrub Tech    Maria Elena Davidson Scrub Tech               Assessment    Living Status: Living  Delivery Location Comment: 433     Apgar Scoring Key:    0 1 2    Skin Color: Blue or pale Acrocyanotic Completely pink    Heart Rate: Absent <100 bpm >100 bpm    Reflex Irritability: No response Grimace Cry or active withdrawal    Muscle Tone: Limp Some flexion Active motion    Respiratory Effort: Absent Weak cry; hypoventilation Good, crying                      Skin Color:   Heart Rate:   Reflex Irritability:   Muscle Tone:   Respiratory Effort:    Total:            1 Minute:        Apgar 1 total from OB History    5 Minute:        Apgar 5 total from OB History    10 Minute:              15 Minute:              20 Minute:                                 Resuscitation    Method: Bulb Suction, Stimulation              Measurements               Title      Skin to Skin Initiation Date/Time: 10/13/22 12:44:00 EDT     Skin to Skin End Date/Time:

## 2022-10-13 NOTE — ANESTHESIA POSTPROCEDURE EVALUATION
Department of Anesthesiology  Postprocedure Note    Patient: Sheri Garrett  MRN: [id number]  YOB: 1990  Date of evaluation: 10/13/2022      Procedure Summary     Date: 10/13/22 Room / Location:     Anesthesia Start: 1127 Anesthesia Stop: 1244    Procedure: Labor Analgesia Diagnosis:     Scheduled Providers:  Responsible Provider: Carlos López MD    Anesthesia Type: epidural ASA Status: 2          Anesthesia Type: No value filed. Adriana Phase I:      Adriana Phase II:        Anesthesia Post Evaluation    Patient location during evaluation: floor  Patient participation: complete - patient participated  Level of consciousness: awake and alert  Airway patency: patent  Nausea: well controlled. Complications: no  Cardiovascular status: acceptable.   Respiratory status: acceptable  Hydration status: stable
No

## 2022-10-13 NOTE — ANESTHESIA PROCEDURE NOTES
Epidural Block    Patient location during procedure: OB  Start time: 10/13/2022 11:33 AM  End time: 10/13/2022 11:37 AM  Reason for block: labor epidural and at surgeon's request  Staffing  Performed: anesthesiologist   Anesthesiologist: Ha Mcclellan MD  Epidural  Patient position: sitting  Prep: ChloraPrep  Patient monitoring: frequent blood pressure checks  Approach: midline  Location: L3-4  Injection technique: WILLIAM saline  Provider prep: mask and sterile gloves  Needle  Needle type: Tuohy   Needle gauge: 17 G  Needle insertion depth: 5 cm  Catheter type: multi-orifice  Catheter size: 19 G  Catheter at skin depth: 9 cm  Test dose: negativeCatheter Secured: tegaderm and tape  Assessment  Hemodynamics: stable  Attempts: 1  Outcomes: patient tolerated procedure well  Preanesthetic Checklist  Completed: patient identified, IV checked, site marked, risks and benefits discussed, surgical/procedural consents, equipment checked, pre-op evaluation, timeout performed, anesthesia consent given, oxygen available and monitors applied/VS acknowledged

## 2022-10-13 NOTE — PROGRESS NOTES
Infant born on 10/13/2023 at 1244 via spontaneous vaginal delivery. Apgars 8&9 at 1 and 5 minutes. Assessment complete. Infant weighed and measured. Vital signs and footprints complete. Vitamin K and Erythromycin given. Cord clamp secure. Infant swaddled and then placed skin to skin with mother.

## 2022-10-14 VITALS
HEART RATE: 54 BPM | OXYGEN SATURATION: 97 % | TEMPERATURE: 97.6 F | RESPIRATION RATE: 16 BRPM | SYSTOLIC BLOOD PRESSURE: 105 MMHG | WEIGHT: 184 LBS | BODY MASS INDEX: 29.57 KG/M2 | DIASTOLIC BLOOD PRESSURE: 56 MMHG | HEIGHT: 66 IN

## 2022-10-14 PROCEDURE — 6360000002 HC RX W HCPCS: Performed by: OBSTETRICS & GYNECOLOGY

## 2022-10-14 PROCEDURE — 90707 MMR VACCINE SC: CPT | Performed by: OBSTETRICS & GYNECOLOGY

## 2022-10-14 PROCEDURE — 90471 IMMUNIZATION ADMIN: CPT | Performed by: OBSTETRICS & GYNECOLOGY

## 2022-10-14 PROCEDURE — 6370000000 HC RX 637 (ALT 250 FOR IP): Performed by: OBSTETRICS & GYNECOLOGY

## 2022-10-14 RX ORDER — PSEUDOEPHEDRINE HCL 30 MG
100 TABLET ORAL 2 TIMES DAILY
Qty: 60 CAPSULE | Refills: 0 | Status: SHIPPED | OUTPATIENT
Start: 2022-10-14

## 2022-10-14 RX ORDER — IBUPROFEN 800 MG/1
800 TABLET ORAL EVERY 6 HOURS
Qty: 120 TABLET | Refills: 3 | Status: SHIPPED | OUTPATIENT
Start: 2022-10-14

## 2022-10-14 RX ADMIN — MEASLES, MUMPS, AND RUBELLA VIRUS VACCINE LIVE 0.5 ML: 1000; 12500; 1000 INJECTION, POWDER, LYOPHILIZED, FOR SUSPENSION SUBCUTANEOUS at 09:06

## 2022-10-14 RX ADMIN — IBUPROFEN 800 MG: 800 TABLET, FILM COATED ORAL at 04:55

## 2022-10-14 RX ADMIN — POLYETHYLENE GLYCOL 3350 17 G: 17 POWDER, FOR SOLUTION ORAL at 09:06

## 2022-10-14 RX ADMIN — DOCUSATE SODIUM 100 MG: 100 CAPSULE ORAL at 09:06

## 2022-10-14 RX ADMIN — IBUPROFEN 800 MG: 800 TABLET, FILM COATED ORAL at 10:41

## 2022-10-14 RX ADMIN — ACETAMINOPHEN 1000 MG: 500 TABLET, FILM COATED ORAL at 05:17

## 2022-10-14 RX ADMIN — ACETAMINOPHEN 1000 MG: 500 TABLET, FILM COATED ORAL at 15:15

## 2022-10-14 ASSESSMENT — PAIN SCALES - GENERAL
PAINLEVEL_OUTOF10: 2
PAINLEVEL_OUTOF10: 1
PAINLEVEL_OUTOF10: 1
PAINLEVEL_OUTOF10: 0

## 2022-10-14 ASSESSMENT — PAIN DESCRIPTION - FREQUENCY
FREQUENCY: INTERMITTENT
FREQUENCY: INTERMITTENT

## 2022-10-14 ASSESSMENT — PAIN DESCRIPTION - ONSET
ONSET: GRADUAL
ONSET: ON-GOING

## 2022-10-14 ASSESSMENT — PAIN DESCRIPTION - LOCATION
LOCATION: ABDOMEN
LOCATION: ABDOMEN

## 2022-10-14 ASSESSMENT — PAIN DESCRIPTION - DESCRIPTORS
DESCRIPTORS: CRAMPING
DESCRIPTORS: CRAMPING

## 2022-10-14 ASSESSMENT — PAIN DESCRIPTION - PAIN TYPE
TYPE: ACUTE PAIN
TYPE: ACUTE PAIN

## 2022-10-14 NOTE — LACTATION NOTE
This note was copied from a baby's chart. Very experienced mom, nursed other children 1year each. Observed at breast in football on R.  Baby fed fairly well. Demonstrated manual lip flange, mom is having some soreness. Encouraged frequent feeding and watch output. Noted planning for discharge today.

## 2022-10-14 NOTE — PROGRESS NOTES
Discharge instructions completed. Patient voiced understanding. Prescriptions sent electronically. Patient discharged home. Ambulating. Wheelchair refused. R/L

## 2022-10-14 NOTE — LACTATION NOTE
This note was copied from a baby's chart. Noted baby going home on biliblanket and back for re-check tomorrow. Mom does not own a pump and does not want to pump. Encouraged frequent feeding at breast.  Keep baby in biliblanket as much as able. Follow output and results for need to start supplementing. Mom would prefer not to supplement. Milk should be coming in soon as baby has been feeding well for the first 24 hours and mom nursed 4 other children 1year each. Next youngest is 3yo. Great output so far. Use hands on technique when feeding. Wake for feedings. Offer breast with any interest.  Encouraged frequent feeding. Watch output. Call as needed.

## 2022-10-14 NOTE — LACTATION NOTE

## 2022-10-14 NOTE — PLAN OF CARE
Problem: Vaginal Birth or  Section  Goal: Fetal and maternal status remain reassuring during the birth process  Description:  Birth OB-Pregnancy care plan goal which identifies if the fetal and maternal status remain reassuring during the birth process  Outcome: Progressing     Problem: Postpartum  Goal: Experiences normal postpartum course  Description:  Postpartum OB-Pregnancy care plan goal which identifies if the mother is experiencing a normal postpartum course  Outcome: Progressing  Goal: Appropriate maternal -  bonding  Description:  Postpartum OB-Pregnancy care plan goal which identifies if the mother and  are bonding appropriately  Outcome: Progressing  Goal: Establishment of infant feeding pattern  Description:  Postpartum OB-Pregnancy care plan goal which identifies if the mother is establishing a feeding pattern with their   Outcome: Progressing  Goal: Incisions, wounds, or drain sites healing without S/S of infection  Outcome: Progressing     Problem: Pain  Goal: Verbalizes/displays adequate comfort level or baseline comfort level  Outcome: Progressing  Flowsheets (Taken 10/13/2022 2127)  Verbalizes/displays adequate comfort level or baseline comfort level:   Encourage patient to monitor pain and request assistance   Assess pain using appropriate pain scale   Administer analgesics based on type and severity of pain and evaluate response   Implement non-pharmacological measures as appropriate and evaluate response   Consider cultural and social influences on pain and pain management   Notify Licensed Independent Practitioner if interventions unsuccessful or patient reports new pain     Problem: Infection - Adult  Goal: Absence of infection at discharge  Outcome: Progressing  Goal: Absence of infection during hospitalization  Outcome: Progressing  Goal: Absence of fever/infection during anticipated neutropenic period  Outcome: Progressing     Problem: Safety - Adult  Goal: Free from fall injury  Outcome: Progressing     Problem: Discharge Planning  Goal: Discharge to home or other facility with appropriate resources  Outcome: Progressing     Problem: Chronic Conditions and Co-morbidities  Goal: Patient's chronic conditions and co-morbidity symptoms are monitored and maintained or improved  Outcome: Progressing

## 2022-10-14 NOTE — DISCHARGE INSTRUCTIONS
After Your Delivery (the Postpartum Period): Care Instructions  Overview     Congratulations on the birth of your baby. Like pregnancy, the  period can be a time of excitement, debra, and exhaustion. You may look at your wondrous little baby and feel happy. You may also be overwhelmed by your new sleep hours and new responsibilities. At first, babies often sleep during the days and are awake at night. They do not have a pattern or routine. They may make sudden gasps, jerk themselves awake, or look like they have crossed eyes. These are all normal, and they may even make you smile. In these first weeks after delivery, try to take good care of yourself. It may take 4 to 6 weeks to feel like yourself again, and possibly longer if you had a  birth. You will likely feel very tired for several weeks. Your days will be full of ups and downs, but lots of debra as well. Follow-up care is a key part of your treatment and safety. Be sure to make and go to all appointments, and call your doctor if you are having problems. It's also a good idea to know your test results and keep a list of the medicines you take. How can you care for yourself at home? Take care of your body after delivery  Use pads instead of tampons for the bloody flow that may last as long as 2 weeks. Ease cramps with ibuprofen (Advil, Motrin). Ease soreness of hemorrhoids and the area between your vagina and rectum with ice compresses or witch hazel pads. Ease constipation by drinking lots of fluid and eating high-fiber foods. Ask your doctor about over-the-counter stool softeners. Cleanse yourself with a gentle squeeze of warm water from a bottle instead of wiping with toilet paper. Take a sitz bath in warm water several times a day. Wear a good nursing bra. Ease sore and swollen breasts with warm, wet washcloths. If you aren't breastfeeding, use ice rather than heat for breast soreness.   Your period may not start for several months if you are breastfeeding. You may bleed more, and longer at first, than you did before you got pregnant. Wait until you are healed (about 4 to 6 weeks) before you have sex. Ask your doctor when it is okay for you to have sex. Try not to travel with your baby for 5 or 6 weeks. If you take a long car trip, make frequent stops to walk around and stretch. Avoid exhaustion  Rest every day. Try to nap when your baby naps. Ask another adult to be with you for a few days after delivery. Plan for  if you have other children. Stay flexible so you can eat at odd hours and sleep when you need to. Both you and your baby are making new schedules. Plan small trips to get out of the house. Change can make you feel less tired. Ask for help with housework, cooking, and shopping. Remind yourself that your job is to care for your baby. Know about help for postpartum depression  \"Baby blues\" are common for the first 1 to 2 weeks after birth. You may cry or feel sad or irritable for no reason. Rest whenever you can. Being tired makes it harder to handle your emotions. Go for walks with your baby. Talk to your partner, friends, and family about your feelings. If your symptoms last for more than a few weeks, or if you feel very depressed, ask your doctor for help. Postpartum depression can be treated. Support groups and counseling can help. Sometimes medicine can also help. Stay healthy  Eat healthy foods so you have more energy. If you breastfeed, avoid drugs. If you quit smoking during pregnancy, try to stay smoke-free. If you choose to have a drink now and then, have only one drink, and limit the number of occasions that you have a drink. Wait to breastfeed at least 2 hours after you have a drink to reduce the amount of alcohol the baby may get in the milk. Start daily exercise after 4 to 6 weeks, but rest when you feel tired. Learn exercises to tone your belly.  Try Kegel exercises to regain strength in your pelvic muscles. You can do these exercises while you stand or sit. (If doing these exercises causes pain, stop doing them and talk with your doctor.)  Squeeze your muscles as if you were trying not to pass gas. Or squeeze your muscles as if you were stopping the flow of urine. Your belly, legs, and buttocks shouldn't move. Hold the squeeze for 3 seconds, then relax for 5 to 10 seconds. Start with 3 seconds, then add 1 second each week until you are able to squeeze for 10 seconds. Repeat the exercise 10 times a session. Do 3 to 8 sessions a day. Find a class for you and your baby that has an exercise time. If you had a  birth, give yourself a bit more time before you exercise, and be careful. When should you call for help? Share this information with your partner, family, or a friend. They can help you watch for warning signs. Call 911  anytime you think you may need emergency care. For example, call if:    You have thoughts of harming yourself, your baby, or another person. You passed out (lost consciousness). You have chest pain, are short of breath, or cough up blood. You have a seizure. Call your doctor now or seek immediate medical care if:    You have signs of hemorrhage (too much bleeding), such as:  Heavy vaginal bleeding. This means that you are soaking through one or more pads in an hour. Or you pass blood clots bigger than an egg. Feeling dizzy or lightheaded, or you feel like you may faint. Feeling so tired or weak that you cannot do your usual activities. A fast or irregular heartbeat. New or worse belly pain. You have signs of infection, such as:  A fever. Vaginal discharge that smells bad. New or worse belly pain. You have symptoms of a blood clot in your leg (called a deep vein thrombosis), such as:  Pain in the calf, back of the knee, thigh, or groin. Redness and swelling in your leg or groin.      You have signs of preeclampsia, such as:  Sudden swelling of your face, hands, or feet. New vision problems (such as dimness, blurring, or seeing spots). A severe headache. Watch closely for changes in your health, and be sure to contact your doctor if:    Your vaginal bleeding isn't decreasing. You feel sad, anxious, or hopeless for more than a few days. You are having problems with your breasts or breastfeeding. Where can you learn more? Go to https://Wenjuan.compejesicaeweb.healthAuthernative. org and sign in to your [employer] account. Enter N620 in the Desi Hits box to learn more about \"After Your Delivery (the Postpartum Period): Care Instructions. \"     If you do not have an account, please click on the \"Sign Up Now\" link. Current as of: February 23, 2022               Content Version: 13.4  © 4096-3644 [employer]wise, Incorporated. Care instructions adapted under license by [facility] If you have questions about a medical condition or this instruction, always ask your healthcare professional. [place] [address] any warranty or liability for your use of this information.

## 2022-10-14 NOTE — CARE COORDINATION
Chart reviewed: Self-pay. SW met with patient to complete initial assessment. Patient confirms that they are \"Self Pay,\" and she denied the need for assistance with applying for Medicaid. Patient without a PCP. Patient denied the need for assistance with applying for Medicaid. Patient denies any history of postpartum or generalized depression/anxiety. Patient given informational packet on  mood & anxiety disorders (resources/education). Family denies any additional needs from  at this time. Family has 's contact information should any needs/questions arise.     THUY Vincent, [address] [facility]   [phone]

## 2022-10-17 NOTE — DISCHARGE SUMMARY
Discharge Summary     Date of Admission:  10/13/2022  6:22 AM  Date of Discharge:  10/14/2022  3:45 PM      Trini Pressley 27 y.o. [id number] presented at 40w5d for induction/in active labor. Pt had  without incident. See delivery note for all delivery details. Pt's PP course was uneventful. On day of D/C, she was ambulating well, afebrile, with lochia < menses. She was discharged home with medications as below. Pt was breast feeding on discharge. She plans on undecided for birth control. HTN diagnosis: no   Routine PP instructions given to patient. She is to follow up with [facility] in 6 weeks for PP exam.       Medication List        START taking these medications      docusate 100 MG Caps  Commonly known as: COLACE, DULCOLAX  Take 100 mg by mouth 2 times daily     ibuprofen 800 MG tablet  Commonly known as: ADVIL;MOTRIN  Take 1 tablet by mouth in the morning and 1 tablet at noon and 1 tablet in the evening and 1 tablet before bedtime.             CONTINUE taking these medications      ONE A DAY PRENATAL PO            STOP taking these medications      SLOW FE PO               Where to Get Your Medications        These medications were sent to [facility], [address] [place] [address] Beth Sequeira [phone]  [address] [place] [address]      Phone: [phone]   docusate 100 MG Caps  ibuprofen 800 MG tablet         Vivian Torres DO  7:56 AM  10/17/22

## 2023-01-04 ENCOUNTER — TELEPHONE (OUTPATIENT)
Dept: OBGYN CLINIC | Age: 33
End: 2023-01-04

## 2023-01-04 NOTE — TELEPHONE ENCOUNTER
Message left from patient. States she has been feeling her heart beat in her throat since Thanksgiving. Wants to talk to Dr. Carly John about this symptom. Attempted to call pt back.  Busy signal.

## 2023-01-04 NOTE — TELEPHONE ENCOUNTER
Call from patient again, would like follow up on feelings of hear heart pounding in her throat that has been ongoing since the end of November. Attempted to call pt back, no answer. Left vm message notifying pt that Dr. Gary Mcgregor is out of office but spoke with Dr. Sampson Olszewski. Instructed pt that this ongoing issue is likely not postpartum related. Advised to contact primary care for follow up. If pt does not have primary care but would like us to get in a referral for a physician, instructed pt to notify our office.

## 2023-01-06 ENCOUNTER — CLINICAL DOCUMENTATION (OUTPATIENT)
Dept: OBGYN CLINIC | Age: 33
End: 2023-01-06

## 2023-01-06 NOTE — PROGRESS NOTES
Pt was scheduled with Dr. Bala Hall today for ? PP hypertension at 10:15. Pt arrived after 10:45. Per Dr. Bala Hall pt needs to r/s as she is late and needs to follow-up with PCP. Dr. Bala Hall said pt is c/o heart beating outside her chest and that is not a concern for high blood pressure. Dr. Bala Hall states pt is over 6 weeks pp and needs to follow-up with PCP, but can r/s for next available for annual with a pap smear. Romina, front office, relayed information to pt about rescheduling. Pt requested to speak with someone in clinical. I brought pt, her , and 2 kids back to room 8. Pt states she wanted to talk to Dr. Bala Hall, pt c/o feeling her heart beat outside her chest, headache, and her BP running high at home. Pt states her BP was 126, but did not say the diastolic #. Pt states her top # is usually 110. Pt states \"she knows that is not high, but its high for her\". I told pt word for word what Dr. Bala Hall said, and that she recommends her to follow up with her PCP. Pt  then rudely says \"are you telling me that you do not care about pp women\". I told the  that I understand they are concerned but I am only telling them what Dr. Bala Hall told me to tell them. The  still rudely says that he wants to speak to Dr. Bala Hall because she obviously does not care about pp women and that their family is full of physicians and that all agree that she needs to see her OB for this and that it is urgent. At that time I turned away from the  and told the pt that I would go see what Dr. Bala Hall suggests and be right back. I made Dr. Bala Hall aware and she still said that same thing, that a systolic pressure of 034 is not yinka and patient needs to follow up with a PCP. I went back to let the pt know and they had walked out.     Lynda Bruce and Vaishnavi (front office) said that they patient's  made a big fuss at the front before leaving stating this is unacceptable and that their family has many physicians. Dr. Ena Ornelas made aware.